# Patient Record
Sex: MALE | Race: WHITE | HISPANIC OR LATINO | Employment: UNEMPLOYED | URBAN - METROPOLITAN AREA
[De-identification: names, ages, dates, MRNs, and addresses within clinical notes are randomized per-mention and may not be internally consistent; named-entity substitution may affect disease eponyms.]

---

## 2022-03-08 ENCOUNTER — APPOINTMENT (OUTPATIENT)
Dept: RADIOLOGY | Facility: MEDICAL CENTER | Age: 47
End: 2022-03-08
Attending: EMERGENCY MEDICINE
Payer: COMMERCIAL

## 2022-03-08 ENCOUNTER — HOSPITAL ENCOUNTER (EMERGENCY)
Facility: MEDICAL CENTER | Age: 47
End: 2022-03-08
Attending: EMERGENCY MEDICINE
Payer: COMMERCIAL

## 2022-03-08 VITALS
HEIGHT: 68 IN | HEART RATE: 69 BPM | OXYGEN SATURATION: 98 % | SYSTOLIC BLOOD PRESSURE: 139 MMHG | RESPIRATION RATE: 15 BRPM | WEIGHT: 200 LBS | BODY MASS INDEX: 30.31 KG/M2 | TEMPERATURE: 98.6 F | DIASTOLIC BLOOD PRESSURE: 90 MMHG

## 2022-03-08 DIAGNOSIS — H60.392 OTHER INFECTIVE ACUTE OTITIS EXTERNA OF LEFT EAR: ICD-10-CM

## 2022-03-08 LAB
ALBUMIN SERPL BCP-MCNC: 4.6 G/DL (ref 3.2–4.9)
ALBUMIN/GLOB SERPL: 1.2 G/DL
ALP SERPL-CCNC: 85 U/L (ref 30–99)
ALT SERPL-CCNC: 18 U/L (ref 2–50)
ANION GAP SERPL CALC-SCNC: 12 MMOL/L (ref 7–16)
AST SERPL-CCNC: 20 U/L (ref 12–45)
BASOPHILS # BLD AUTO: 0.4 % (ref 0–1.8)
BASOPHILS # BLD: 0.04 K/UL (ref 0–0.12)
BILIRUB SERPL-MCNC: 1.4 MG/DL (ref 0.1–1.5)
BUN SERPL-MCNC: 11 MG/DL (ref 8–22)
CALCIUM SERPL-MCNC: 9.6 MG/DL (ref 8.5–10.5)
CHLORIDE SERPL-SCNC: 101 MMOL/L (ref 96–112)
CO2 SERPL-SCNC: 25 MMOL/L (ref 20–33)
CREAT SERPL-MCNC: 0.77 MG/DL (ref 0.5–1.4)
EOSINOPHIL # BLD AUTO: 0.03 K/UL (ref 0–0.51)
EOSINOPHIL NFR BLD: 0.3 % (ref 0–6.9)
ERYTHROCYTE [DISTWIDTH] IN BLOOD BY AUTOMATED COUNT: 46.8 FL (ref 35.9–50)
GLOBULIN SER CALC-MCNC: 3.7 G/DL (ref 1.9–3.5)
GLUCOSE SERPL-MCNC: 96 MG/DL (ref 65–99)
HCT VFR BLD AUTO: 41.8 % (ref 42–52)
HGB BLD-MCNC: 14.3 G/DL (ref 14–18)
IMM GRANULOCYTES # BLD AUTO: 0.05 K/UL (ref 0–0.11)
IMM GRANULOCYTES NFR BLD AUTO: 0.6 % (ref 0–0.9)
LYMPHOCYTES # BLD AUTO: 1.97 K/UL (ref 1–4.8)
LYMPHOCYTES NFR BLD: 21.8 % (ref 22–41)
MCH RBC QN AUTO: 33.2 PG (ref 27–33)
MCHC RBC AUTO-ENTMCNC: 34.2 G/DL (ref 33.7–35.3)
MCV RBC AUTO: 97 FL (ref 81.4–97.8)
MONOCYTES # BLD AUTO: 1.09 K/UL (ref 0–0.85)
MONOCYTES NFR BLD AUTO: 12.1 % (ref 0–13.4)
NEUTROPHILS # BLD AUTO: 5.84 K/UL (ref 1.82–7.42)
NEUTROPHILS NFR BLD: 64.8 % (ref 44–72)
NRBC # BLD AUTO: 0 K/UL
NRBC BLD-RTO: 0 /100 WBC
PLATELET # BLD AUTO: 200 K/UL (ref 164–446)
PMV BLD AUTO: 10.4 FL (ref 9–12.9)
POTASSIUM SERPL-SCNC: 4.5 MMOL/L (ref 3.6–5.5)
PROT SERPL-MCNC: 8.3 G/DL (ref 6–8.2)
RBC # BLD AUTO: 4.31 M/UL (ref 4.7–6.1)
SODIUM SERPL-SCNC: 138 MMOL/L (ref 135–145)
WBC # BLD AUTO: 9 K/UL (ref 4.8–10.8)

## 2022-03-08 PROCEDURE — 70491 CT SOFT TISSUE NECK W/DYE: CPT

## 2022-03-08 PROCEDURE — A9270 NON-COVERED ITEM OR SERVICE: HCPCS | Performed by: EMERGENCY MEDICINE

## 2022-03-08 PROCEDURE — 85025 COMPLETE CBC W/AUTO DIFF WBC: CPT

## 2022-03-08 PROCEDURE — 700117 HCHG RX CONTRAST REV CODE 255: Performed by: EMERGENCY MEDICINE

## 2022-03-08 PROCEDURE — 700102 HCHG RX REV CODE 250 W/ 637 OVERRIDE(OP): Performed by: EMERGENCY MEDICINE

## 2022-03-08 PROCEDURE — 99283 EMERGENCY DEPT VISIT LOW MDM: CPT

## 2022-03-08 PROCEDURE — 80053 COMPREHEN METABOLIC PANEL: CPT

## 2022-03-08 PROCEDURE — 36415 COLL VENOUS BLD VENIPUNCTURE: CPT

## 2022-03-08 RX ORDER — AMOXICILLIN AND CLAVULANATE POTASSIUM 875; 125 MG/1; MG/1
1 TABLET, FILM COATED ORAL ONCE
Status: COMPLETED | OUTPATIENT
Start: 2022-03-08 | End: 2022-03-08

## 2022-03-08 RX ORDER — AMOXICILLIN AND CLAVULANATE POTASSIUM 875; 125 MG/1; MG/1
1 TABLET, FILM COATED ORAL 2 TIMES DAILY
Qty: 14 TABLET | Refills: 0 | Status: SHIPPED | OUTPATIENT
Start: 2022-03-08 | End: 2022-03-15

## 2022-03-08 RX ORDER — NEOMYCIN SULFATE, POLYMYXIN B SULFATE AND HYDROCORTISONE 10; 3.5; 1 MG/ML; MG/ML; [USP'U]/ML
4 SUSPENSION/ DROPS AURICULAR (OTIC) 3 TIMES DAILY
Qty: 10 ML | Refills: 0 | Status: SHIPPED | OUTPATIENT
Start: 2022-03-08

## 2022-03-08 RX ADMIN — IOHEXOL 80 ML: 350 INJECTION, SOLUTION INTRAVENOUS at 15:19

## 2022-03-08 RX ADMIN — AMOXICILLIN AND CLAVULANATE POTASSIUM 1 TABLET: 875; 125 TABLET, FILM COATED ORAL at 16:18

## 2022-03-08 ASSESSMENT — ENCOUNTER SYMPTOMS: FEVER: 0

## 2022-03-08 NOTE — ED NOTES
Patient is resting comfortably, returned from imaging, aware of pending POC. Officers remain at bedside.

## 2022-03-08 NOTE — ED PROVIDER NOTES
ED Provider Note    Scribed for Richard Barajas M.D. by Yrn Terrazas. 3/8/2022, 2:20 PM.    Primary care provider: Pcp Pt States None  Means of arrival: Police Escort (Walk-in)  History obtained from: Patient  History limited by: None    CHIEF COMPLAINT  Chief Complaint   Patient presents with   • Ear Pain     Pt arrives via PD from [facility], patient with left ear pain x 1 week, evaluated by staff there & reported cerumen impaction. Pt reports worsening swelling & tenderness. Pt arrives AAOx4, ambulatory to [facility]ndricks[facility] bed.        HPI  Four Swapna is a 46 y.o. male who presents to the Emergency Department via a Police Escort as a transfer from [facility] for left ear pain. Onset 4 days ago. He is an inmate at the [facility]. He admits to associated symptoms of swelling, tenderness, and dental pain. He denies any fever or other symptoms of illness. He denies any major medical history.     Staff at the [facility] evaluated him and diagnosed him with a cerumen impaction so he was transferred to the ED for further evaluation.  He did have his ear disimpacted.  He also scratched his ear with a Q-tip before that.  His increasing pain and swelling since that time.  No other facial pain or swelling.  No pain behind the ear.  Was sent here by the [facility] clinician for possible mastoiditis.    REVIEW OF SYSTEMS  Review of Systems   Constitutional: Negative for fever.   HENT: Positive for ear pain (left).         Positive for swelling and tenderness of the left ear.  Positive for dental pain   All other systems reviewed and are negative.      PAST MEDICAL HISTORY   None reported.     SURGICAL HISTORY  patient denies any surgical history    SOCIAL HISTORY  Social History     Tobacco Use   • Smoking status: Never Smoker   • Smokeless tobacco: Never Used   Vaping Use   • Vaping Use: Never used   Substance Use Topics   • Alcohol use: Never   • Drug use: Never      Social History     Substance and  "Sexual Activity   Drug Use Never       FAMILY HISTORY  History reviewed. No pertinent family history.    CURRENT MEDICATIONS  Home Medications     Reviewed by Layla Forde R.N. (Registered Nurse) on 03/08/22 at 1401  Med List Status: <None>   Medication Last Dose Status        Patient Johny Taking any Medications                       ALLERGIES  No Known Allergies    PHYSICAL EXAM  VITAL SIGNS: /92   Pulse 67   Resp 15   Ht 1.727 m (5' 8\")   Wt 90.7 kg (200 lb)   SpO2 99%   BMI 30.41 kg/m²   Vitals reviewed.  Constitutional: Well developed, Well nourished, No acute distress, Non-toxic appearance.   HENT: *Normocephalic, Atraumatic, Bilateral external ears normal, Oropharynx moist, no signs of odontogenic infection.  There is periauricular lymphadenopathy and swelling.  Pain with manipulation of the ear.  The external canal on the left is swollen.  Unable to see the tympanic membrane.  The right side is unremarkable.  No swelling or tenderness of the mastoid air cells.  Eyes: PERRL, EOMI, Conjunctiva normal, No discharge.   Neck: Normal range of motion,   Cardiovascular: Normal heart rate, Normal rhythm, No murmurs, No rubs, No gallops.   Thorax & Lungs: Normal breath sounds, No respiratory distress, No wheezing  Abdomen: Bowel sounds normal, Soft, No tenderness    Musculoskeletal: Good range of motion in all major joints  Neurologic: Alert, No focal deficits noted.   Psychiatric: Affect normal    Results for orders placed or performed during the hospital encounter of 03/08/22   CBC WITH DIFFERENTIAL   Result Value Ref Range    WBC 9.0 4.8 - 10.8 K/uL    RBC 4.31 (L) 4.70 - 6.10 M/uL    Hemoglobin 14.3 14.0 - 18.0 g/dL    Hematocrit 41.8 (L) 42.0 - 52.0 %    MCV 97.0 81.4 - 97.8 fL    MCH 33.2 (H) 27.0 - 33.0 pg    MCHC 34.2 33.7 - 35.3 g/dL    RDW 46.8 35.9 - 50.0 fL    Platelet Count 200 164 - 446 K/uL    MPV 10.4 9.0 - 12.9 fL    Neutrophils-Polys 64.80 44.00 - 72.00 %    Lymphocytes 21.80 (L) " 22.00 - 41.00 %    Monocytes 12.10 0.00 - 13.40 %    Eosinophils 0.30 0.00 - 6.90 %    Basophils 0.40 0.00 - 1.80 %    Immature Granulocytes 0.60 0.00 - 0.90 %    Nucleated RBC 0.00 /100 WBC    Neutrophils (Absolute) 5.84 1.82 - 7.42 K/uL    Lymphs (Absolute) 1.97 1.00 - 4.80 K/uL    Monos (Absolute) 1.09 (H) 0.00 - 0.85 K/uL    Eos (Absolute) 0.03 0.00 - 0.51 K/uL    Baso (Absolute) 0.04 0.00 - 0.12 K/uL    Immature Granulocytes (abs) 0.05 0.00 - 0.11 K/uL    NRBC (Absolute) 0.00 K/uL   COMP METABOLIC PANEL   Result Value Ref Range    Sodium 138 135 - 145 mmol/L    Potassium 4.5 3.6 - 5.5 mmol/L    Chloride 101 96 - 112 mmol/L    Co2 25 20 - 33 mmol/L    Anion Gap 12.0 7.0 - 16.0    Glucose 96 65 - 99 mg/dL    Bun 11 8 - 22 mg/dL    Creatinine 0.77 0.50 - 1.40 mg/dL    Calcium 9.6 8.5 - 10.5 mg/dL    AST(SGOT) 20 12 - 45 U/L    ALT(SGPT) 18 2 - 50 U/L    Alkaline Phosphatase 85 30 - 99 U/L    Total Bilirubin 1.4 0.1 - 1.5 mg/dL    Albumin 4.6 3.2 - 4.9 g/dL    Total Protein 8.3 (H) 6.0 - 8.2 g/dL    Globulin 3.7 (H) 1.9 - 3.5 g/dL    A-G Ratio 1.2 g/dL   ESTIMATED GFR   Result Value Ref Range    GFR If African American >60 >60 mL/min/1.73 m 2    GFR If Non African American >60 >60 mL/min/1.73 m 2      All labs reviewed by me.        RADIOLOGY  CT-SOFT TISSUE NECK WITH   Final Result      1.  Findings consistent with acute otitis externa. No evidence of drainable fluid collection.   2.  Enlarged left intraparotid lymph nodes, likely reactive follow-up recommended to ensure resolution.        The radiologist's interpretation of all radiological studies have been reviewed by me.    COURSE & MEDICAL DECISION MAKING  Pertinent Labs & Imaging studies reviewed. (See chart for details)    Obtained and reviewed past medical records transferring physician.    2:20 PM Patient seen and examined at bedside. The patient presents with left ear pain, and the differential diagnosis includes but is not limited to Otitis Externa,  Mastoiditis abscess, Odontogenic infection. Ordered for CT-Soft tissue neck with, CBC with differential, and CMP to evaluate.     Labs are reassuring.  There is no evidence of diabetes or immunocompromise.  Patient is not febrile ill or toxic.  CT shows otitis externa which is consistent with his clinical findings.  Does not have evidence of acute mastoiditis.  Mastoids and temporal bones are clear.  Including clear mastoid air cells.    CT does not show any drainable fluid collection.  This appears to be an acute uncomplicated otitis externa.  To be managed with eardrops and oral antibiotics.    This point I do not see an indication for ENT consultation emergency department.  He can follow-up if he does not get better.  He started on Augmentin and Cortisporin.  Return for pain swelling redness fever or other concerns.  Questions are answered is agreeable plan discharge back to [facility].    Boris Sam M.D.  [address] [place] [id number]  [phone]              FINAL IMPRESSION  1. Other infective acute otitis externa of left ear          Yrn PABLO (Jessi), am scribing for, and in the presence of, Richard Barajas M.D..    Electronically signed by: Yrn Terrazas (Jessi), 3/8/2022    IRichard M.D. personally performed the services described in this documentation, as scribed by Yrn Terrazas in my presence, and it is both accurate and complete.    The note accurately reflects work and decisions made by me.  Richard Barajas M.D.  3/8/2022  4:08 PM

## 2022-03-08 NOTE — ED TRIAGE NOTES
Chief Complaint   Patient presents with   • Ear Pain     Pt arrives via PD from [facility], patient with left ear pain x 1 week, evaluated by staff there & reported cerumen impaction. Pt reports worsening swelling & tenderness. Pt arrives AAOx4, ambulatory to [facility] bed.

## 2022-03-09 NOTE — DISCHARGE INSTRUCTIONS
Antibiotics as prescribed.  Return for worsening pain fever or other concerns.  Take ibuprofen for pain.  Follow-up with your doctor.  Follow-up with the ENT doctor if your symptoms worsen

## 2022-03-09 NOTE — ED NOTES
Discharge orders received, IV discontinued, instructions and education given, follow-up discussed, pt verbalized understanding. Departs with officers in custody, prescriptions reviewed with pt.